# Patient Record
Sex: MALE | Race: BLACK OR AFRICAN AMERICAN | NOT HISPANIC OR LATINO | URBAN - METROPOLITAN AREA
[De-identification: names, ages, dates, MRNs, and addresses within clinical notes are randomized per-mention and may not be internally consistent; named-entity substitution may affect disease eponyms.]

---

## 2020-07-25 ENCOUNTER — TELEPHONE ENCOUNTER (OUTPATIENT)
Dept: URBAN - METROPOLITAN AREA CLINIC 13 | Facility: CLINIC | Age: 50
End: 2020-07-25

## 2020-07-26 ENCOUNTER — TELEPHONE ENCOUNTER (OUTPATIENT)
Dept: URBAN - METROPOLITAN AREA CLINIC 13 | Facility: CLINIC | Age: 50
End: 2020-07-26

## 2020-07-26 RX ORDER — LISINOPRIL AND HYDROCHLOROTHIAZIDE TABLETS 10; 12.5 MG/1; MG/1
TAKE 1 TABLET DAILY TABLET ORAL
Refills: 0 | Status: ACTIVE | COMMUNITY

## 2020-07-26 RX ORDER — MULTIVITAMIN
TAKE 1 TABLET DAILY TABLET ORAL
Refills: 0 | Status: ACTIVE | COMMUNITY

## 2020-07-26 RX ORDER — BIOTIN 2500 MCG
TAKE 1 CAPSULE DAILY CAPSULE ORAL
Refills: 0 | Status: ACTIVE | COMMUNITY

## 2025-07-15 ENCOUNTER — OFFICE VISIT (OUTPATIENT)
Dept: URBAN - METROPOLITAN AREA CLINIC 113 | Facility: CLINIC | Age: 55
End: 2025-07-15
Payer: COMMERCIAL

## 2025-07-15 ENCOUNTER — DASHBOARD ENCOUNTERS (OUTPATIENT)
Age: 55
End: 2025-07-15

## 2025-07-15 ENCOUNTER — LAB OUTSIDE AN ENCOUNTER (OUTPATIENT)
Dept: URBAN - METROPOLITAN AREA CLINIC 113 | Facility: CLINIC | Age: 55
End: 2025-07-15

## 2025-07-15 DIAGNOSIS — K76.0 HEPATIC STEATOSIS: ICD-10-CM

## 2025-07-15 DIAGNOSIS — F10.90 ALCOHOL USE: ICD-10-CM

## 2025-07-15 DIAGNOSIS — R79.89 ELEVATED LFTS: ICD-10-CM

## 2025-07-15 PROBLEM — 197321007: Status: ACTIVE | Noted: 2025-07-15

## 2025-07-15 PROCEDURE — 99203 OFFICE O/P NEW LOW 30 MIN: CPT | Performed by: NURSE PRACTITIONER

## 2025-07-15 RX ORDER — VITAMIN B COMPLEX
AS DIRECTED CAPSULE ORAL
Status: ACTIVE | COMMUNITY

## 2025-07-15 RX ORDER — MULTIVITAMIN
TAKE 1 TABLET DAILY TABLET ORAL
Refills: 0 | Status: ACTIVE | COMMUNITY

## 2025-07-15 RX ORDER — BIOTIN 2500 MCG
TAKE 1 CAPSULE DAILY CAPSULE ORAL
Refills: 0 | Status: ON HOLD | COMMUNITY

## 2025-07-15 RX ORDER — ATORVASTATIN CALCIUM 10 MG/1
1 TABLET TABLET, FILM COATED ORAL ONCE A DAY
Status: ACTIVE | COMMUNITY

## 2025-07-15 RX ORDER — LISINOPRIL AND HYDROCHLOROTHIAZIDE TABLETS 10; 12.5 MG/1; MG/1
TAKE 1 TABLET DAILY TABLET ORAL
Refills: 0 | Status: ACTIVE | COMMUNITY

## 2025-07-15 NOTE — HPI-TODAY'S VISIT:
55-year-old gentleman with a history of mild LFT elevation manage in 2018 in the setting of 3 glasses of wine per night, negative hepatitis screens, presenting for long interval follow-up regarding his liver.  He was referred back to our office in November 2024 by Dr. Megan Lott for elevated transaminases, history of hepatic steatosis and alcohol use.  Labs 5/16/2025: WBC 7.92, hemoglobin 14.3, hematocrit 45, .3, platelet count 246, sodium 141, potassium 3.6, BUN 9, creatinine 0.9, glucose 83, hemoglobin A1c 5.8, calcium 9, T. bili 0.4, AST 45, ALT 29 and .  He drinks alcohol, estimated to be about 3 glasses of wine per day. He is without any abdominal complaints. No dysphagia, heartburn, regurgitation, unintentional weight loss, nausea, vomiting, hematemesis or melena. Bowels are moving regularly without blood per rectum. No complaints of bloating. No jaundice, icterus.

## 2025-07-19 LAB
% SATURATION: 29
A/G RATIO: 2
ABSOLUTE BASOPHILS: 61
ABSOLUTE EOSINOPHILS: 132
ABSOLUTE LYMPHOCYTES: 1020
ABSOLUTE MONOCYTES: 503
ABSOLUTE NEUTROPHILS: 2985
ACTIN (SMOOTH MUSCLE) ANTIBODY (IGG): <20
ALBUMIN: 4.7
ALKALINE PHOSPHATASE: 103
ALPHA-1-ANTITRYPSIN (AAT) PHENOTYPE: (no result)
ALPHA-1-ANTITRYPSIN QN: 139
ALT (SGPT): 23
ANA SCREEN, IFA: NEGATIVE
AST (SGOT): 25
BASOPHILS: 1.3
BILIRUBIN, TOTAL: 0.5
BUN/CREATININE RATIO: (no result)
BUN: 8
CALCIUM: 10
CARBON DIOXIDE, TOTAL: 30
CHLORIDE: 102
CREATININE: 0.89
EGFR: 102
EOSINOPHILS: 2.8
FERRITIN: 325
GLOBULIN, TOTAL: 2.4
GLUCOSE: 108
HBSAG SCREEN: (no result)
HEMATOCRIT: 40.9
HEMOGLOBIN: 14.2
HEP A AB, IGM: (no result)
HEP B CORE AB, IGM: (no result)
HEPATITIS C ANTIBODY: (no result)
IRON BINDING CAPACITY: 333
IRON, TOTAL: 98
LKM-1 ANTIBODY (IGG): <=20
LYMPHOCYTES: 21.7
MCH: 32.6
MCHC: 34.7
MCV: 93.8
MITOCHONDRIAL (M2) ANTIBODY: <=20
MONOCYTES: 10.7
MPV: 9.5
NEUTROPHILS: 63.5
PLATELET COUNT: 290
POTASSIUM: 3.9
PROTEIN, TOTAL: 7.1
RDW: 12.5
RED BLOOD CELL COUNT: 4.36
SODIUM: 139
WHITE BLOOD CELL COUNT: 4.7